# Patient Record
Sex: MALE | Employment: FULL TIME | URBAN - METROPOLITAN AREA
[De-identification: names, ages, dates, MRNs, and addresses within clinical notes are randomized per-mention and may not be internally consistent; named-entity substitution may affect disease eponyms.]

---

## 2019-10-21 ENCOUNTER — OFFICE VISIT (OUTPATIENT)
Dept: NEUROLOGY | Age: 24
End: 2019-10-21

## 2019-10-21 ENCOUNTER — DOCUMENTATION ONLY (OUTPATIENT)
Dept: NEUROLOGY | Age: 24
End: 2019-10-21

## 2019-10-21 VITALS
DIASTOLIC BLOOD PRESSURE: 70 MMHG | WEIGHT: 185 LBS | RESPIRATION RATE: 16 BRPM | HEART RATE: 70 BPM | SYSTOLIC BLOOD PRESSURE: 142 MMHG | OXYGEN SATURATION: 98 % | HEIGHT: 75 IN | BODY MASS INDEX: 23 KG/M2

## 2019-10-21 DIAGNOSIS — G40.B09 NONINTRACTABLE JUVENILE MYOCLONIC EPILEPSY WITHOUT STATUS EPILEPTICUS (HCC): Primary | ICD-10-CM

## 2019-10-21 RX ORDER — DIVALPROEX SODIUM 500 MG/1
1000 TABLET, DELAYED RELEASE ORAL 2 TIMES DAILY
COMMUNITY
End: 2019-10-21 | Stop reason: SDUPTHER

## 2019-10-21 RX ORDER — DIVALPROEX SODIUM 500 MG/1
1000 TABLET, DELAYED RELEASE ORAL 2 TIMES DAILY
Qty: 360 TAB | Refills: 3 | Status: SHIPPED | OUTPATIENT
Start: 2019-10-21

## 2019-10-21 NOTE — PATIENT INSTRUCTIONS
PRESCRIPTION REFILL POLICY [facility] Statement to Patients April 1, 2014 In an effort to ensure the large volume of patient prescription refills is processed in the most efficient and expeditious manner, we are asking our patients to assist us by calling your Pharmacy for all prescription refills, this will include also your  Mail Order Pharmacy. The pharmacy will contact our office electronically to continue the refill process. Please do not wait until the last minute to call your pharmacy. We need at least 48 hours (2days) to fill prescriptions. We also encourage you to call your pharmacy before going to  your prescription to make sure it is ready. With regard to controlled substance prescription refill requests (narcotic refills) that need to be picked up at our office, we ask your cooperation by providing us with at least 72 hours (3days) notice that you will need a refill. We will not refill narcotic prescription refill requests after 4:00pm on any weekday, Monday through Thursday, or after 2:00pm on Fridays, or on the weekends. We encourage everyone to explore another way of getting your prescription refill request processed using [employer], our patient web portal through our electronic medical record system. [employer] is an efficient and effective way to communicate your medication request directly to the office and  downloadable as an trevon on your smart phone . [employer] also features a review functionality that allows you to view your medication list as well as leave messages for your physician. Are you ready to get connected? If so please review the attatched instructions or speak to any of our staff to get you set up right away! Thank you so much for your cooperation. Should you have any questions please contact our Practice Administrator. The Physicians and Staff,  [facility]

## 2019-10-21 NOTE — PROGRESS NOTES
Mr. Elliott Rodriguez presents as a new patient for evaluation of possible epilepsy. Depression screening done on patient.

## 2019-10-21 NOTE — PROGRESS NOTES
Chief Complaint   Patient presents with    Neurologic Problem         HISTORY OF PRESENT ILLNESS  Hi Obrien is a 26 y.o. male who came in to [facility] care. He used to follow-up with a neurologist in [place]. He was diagnosed with juvenile myoclonic epilepsy at around age 7. He used to be on Keppra which she took until about 2017 when he had breakthrough seizures. He had 3 seizures within 2 weeks. Then he was switched to Depakote and was taking 750 mg twice daily. Was doing quite well until July of this year when he had another generalized tonic-clonic seizure. Then the dose was switched to 1000 mg twice daily and he has been doing fine on it so far. He does not remember his seizures and they are all witnessed generalized tonic-clonic events associated with tongue bite. Denies any myoclonic twitches anymore. There is no family history. He plans to live in the [facility] for the near future and his girlfriend is in [facility]. He is currently working in [employer]. History reviewed. No pertinent past medical history. Current Outpatient Medications   Medication Sig    divalproex DR (DEPAKOTE) 500 mg tablet Take 2 Tabs by mouth two (2) times a day. No current facility-administered medications for this visit. Not on File  History reviewed. No pertinent family history.   Social History     Tobacco Use    Smoking status: Never Smoker    Smokeless tobacco: Never Used   Substance Use Topics    Alcohol use: Not Currently    Drug use: Not on file     Past Surgical History:   Procedure Laterality Date    HX HEENT           REVIEW OF SYSTEMS  Review of Systems - History obtained from the patient  Psychological ROS: negative  ENT ROS: negative  Hematological and Lymphatic ROS: negative  Endocrine ROS: negative  Respiratory ROS: no cough, shortness of breath, or wheezing  Cardiovascular ROS: no chest pain or dyspnea on exertion  Gastrointestinal ROS: no abdominal pain, change in bowel habits, or black or bloody stools  Genito-Urinary ROS: no dysuria, trouble voiding, or hematuria  Musculoskeletal ROS: negative  Dermatological ROS: negative      PHYSICAL EXAMINATION:    Visit Vitals  /70   Pulse 70   Resp 16   Ht 6' 3\" (1.905 m)   Wt 83.9 kg (185 lb)   SpO2 98%   BMI 23.12 kg/m²     General:  Well defined, nourished, and groomed individual in no acute distress. Neck: Supple, nontender, thyroid within normal limits, no JVD, no bruits, no pain with resistance to active range of motion. Heart: Regular rate and rhythm, no murmurs, rub, or gallop. Normal S1S2. Lungs:  Equal chest expansion, no cough, no wheeze  Musculoskeletal:  Extremities revealed no edema and had full range of motion of joints. Psych:  Good mood and bright affect    NEUROLOGICAL EXAMINATION:     Mental Status:   Alert and oriented to person, place, and time. Attention span and concentration are normal. Speech is fluent. Cranial Nerves:    II, III, IV, VI:  Visual acuity grossly intact. Visual fields are normal.    Pupils are equal, round, and reactive to light and accommodation. Extra-ocular movements are full and fluid. V-XII: Hearing is grossly intact. Facial features are symmetric, with normal sensation and strength. The palate rises symmetrically and the tongue protrudes midline. Sternocleidomastoids 5/5. Motor Examination: Normal tone, bulk, and strength. 5/5 muscle strength throughout. No cogwheel rigidity or clonus present. Sensory exam:  Normal throughout to pinprick, temperature, and vibration sense. Normal proprioception. Coordination:  Finger to nose and rapid arm movement testing was normal.   No resting or intention tremor    Gait and Station:  Steady while walking on toes, heels, and with tandem walking. Normal arm swing. No Rhomberg or pronator drift. No muscle wasting or fasiculations noted. Reflexes:  DTRs 2+ throughout. Toes downgoing. LABS / IMAGING  Old records are not available for review    ASSESSMENT    ICD-10-CM ICD-9-CM    1. Nonintractable juvenile myoclonic epilepsy without status epilepticus ([facility] G40. B09 345.10 divalproex DR (DEPAKOTE) 500 mg tablet      EEG       DISCUSSION  Mr. Susan Carranza has established diagnosis of juvenile myoclonic epilepsy by history  I will request old records including recent labs  Obtain EEG  Refills were provided for Depakote  He is aware of the state law of no driving for 6 months after a seizure  Follow-up annually or earlier if needed    Sayda Ferrell MD  Diplomate, American Board of Psychiatry & Neurology (Neurology)  Gilberto Escamilla Board of Psychiatry & Neurology (Clinical Neurophysiology)  Diplomate, American Board of Electrodiagnostic Medicine  This note will not be viewable in [address]

## 2020-03-27 ENCOUNTER — HOSPITAL ENCOUNTER (EMERGENCY)
Age: 25
Discharge: HOME | End: 2020-03-27
Attending: STUDENT IN AN ORGANIZED HEALTH CARE EDUCATION/TRAINING PROGRAM
Payer: OTHER MISCELLANEOUS

## 2020-03-27 ENCOUNTER — APPOINTMENT (OUTPATIENT)
Dept: CT IMAGING | Age: 25
End: 2020-03-27
Attending: PHYSICIAN ASSISTANT
Payer: OTHER MISCELLANEOUS

## 2020-03-27 VITALS
RESPIRATION RATE: 16 BRPM | HEIGHT: 75 IN | BODY MASS INDEX: 23.85 KG/M2 | DIASTOLIC BLOOD PRESSURE: 74 MMHG | OXYGEN SATURATION: 98 % | SYSTOLIC BLOOD PRESSURE: 117 MMHG | HEART RATE: 77 BPM | WEIGHT: 191.8 LBS | TEMPERATURE: 97.9 F

## 2020-03-27 DIAGNOSIS — S09.90XA INJURY OF HEAD, INITIAL ENCOUNTER: ICD-10-CM

## 2020-03-27 DIAGNOSIS — S06.0X0A CONCUSSION WITHOUT LOSS OF CONSCIOUSNESS, INITIAL ENCOUNTER: Primary | ICD-10-CM

## 2020-03-27 PROCEDURE — 72125 CT NECK SPINE W/O DYE: CPT

## 2020-03-27 PROCEDURE — 99283 EMERGENCY DEPT VISIT LOW MDM: CPT

## 2020-03-27 PROCEDURE — 70450 CT HEAD/BRAIN W/O DYE: CPT

## 2020-03-27 NOTE — LETTER
[facility] 
[address] [address] 
[phone] Work/School Note Date: 3/27/2020 To Whom It May concern: Hi Obrien was seen and treated today in the emergency room by the following provider(s): 
Physician Assistant: Al Plaza. Hi Obrien may return to work on 3/28/2020.  
 
Sincerely, 
 
 
 
 
Maninder Barbour MD

## 2020-03-27 NOTE — ED PROVIDER NOTES
HPI   Pt is a 67-year-old male with a history of epilepsy and concussion. He presents to the emergency department today with head injury. He states that he was at work, loading boxes of wine onto a shelf and 1 of the boxes slid, hitting him on the top of the head. He Noticed immediate headache, ringing in his ears, felt fatigued and lightheaded and then approximately 20 minutes later vomited. He was sent to [facility] by his employer, and [facility] sent pt here based on symptoms and mechanism with patient medical hx. He is persistently nauseated at this time and continues to experience tinnitus. Patient states that his headache is improved, he denies dizziness or lightheadedness at this time. He is experiencing pain in the C2-C3 area of his neck that is exacerbated with rotation of the neck. Pt denies chest pain, shortness of breath, difficulty breathing, unusual bleeding, bruising, or swelling. Past Medical History:   Diagnosis Date    Aortic stenosis     Asthma     Concussion     Epilepsy [facility])        Past Surgical History:   Procedure Laterality Date    HX HEENT      HX TONSILLECTOMY           No family history on file.     Social History     Socioeconomic History    Marital status: SINGLE     Spouse name: Not on file    Number of children: Not on file    Years of education: Not on file    Highest education level: Not on file   Occupational History    Not on file   Social Needs    Financial resource strain: Not on file    Food insecurity     Worry: Not on file     Inability: Not on file    Transportation needs     Medical: Not on file     Non-medical: Not on file   Tobacco Use    Smoking status: Never Smoker    Smokeless tobacco: Never Used   Substance and Sexual Activity    Alcohol use: Yes     Comment: occassionally    Drug use: Never    Sexual activity: Not on file   Lifestyle    Physical activity     Days per week: Not on file     Minutes per session: Not on file    Stress: Not on file   Relationships    Social connections     Talks on phone: Not on file     Gets together: Not on file     Attends [employer] service: Not on file     Active member of club or organization: Not on file     Attends meetings of clubs or organizations: Not on file     Relationship status: Not on file    Intimate partner violence     Fear of current or ex partner: Not on file     Emotionally abused: Not on file     Physically abused: Not on file     Forced sexual activity: Not on file   Other Topics Concern    Not on file   Social History Narrative    Not on file         ALLERGIES: Patient has no known allergies. Review of Systems   Constitutional: Positive for fatigue. Negative for chills and fever. HENT: Positive for tinnitus. Negative for congestion, ear pain, hearing loss, sinus pain, sore throat and trouble swallowing. Eyes: Negative for photophobia and visual disturbance. Respiratory: Negative for chest tightness and shortness of breath. Cardiovascular: Negative for chest pain. Gastrointestinal: Positive for nausea and vomiting. Negative for abdominal pain. Genitourinary: Negative for dysuria. Musculoskeletal: Positive for neck pain. Negative for back pain, joint swelling and neck stiffness. Neurological: Positive for dizziness, light-headedness and headaches. Negative for tremors, seizures, syncope, speech difficulty, weakness and numbness. Hematological: Negative for adenopathy. Does not bruise/bleed easily. Vitals:    03/27/20 1131   BP: 112/82   Pulse: 82   Resp: 16   Temp: 97.8 °F (36.6 °C)   SpO2: 100%   Weight: 87 kg (191 lb 12.8 oz)   Height: 6' 3\" (1.905 m)            Physical Exam  Constitutional:       General: He is not in acute distress. Appearance: Normal appearance. He is normal weight. He is not ill-appearing, toxic-appearing or diaphoretic. HENT:      Head: Normocephalic and atraumatic.  No raccoon eyes, Thornton's sign, abrasion, contusion, masses, right periorbital erythema or left periorbital erythema. Hair is normal.      Jaw: There is normal jaw occlusion. Comments: Contusion with swelling to between eyes. Tenderness without contusion to top of crown     Right Ear: Tympanic membrane, ear canal and external ear normal. There is no impacted cerumen. Left Ear: Tympanic membrane, ear canal and external ear normal. There is no impacted cerumen. Nose: Nose normal.      Mouth/Throat:      Mouth: Mucous membranes are moist.      Pharynx: Oropharynx is clear. No oropharyngeal exudate or posterior oropharyngeal erythema. Eyes:      Extraocular Movements: Extraocular movements intact. Pupils: Pupils are equal, round, and reactive to light. Neck:      Musculoskeletal: Pain with movement, spinous process tenderness and muscular tenderness present. No edema, erythema, neck rigidity or crepitus. Trachea: Trachea and phonation normal.        Comments: Pt has pain with lateral  / rotational ROM secondary to axial loading injury  Cardiovascular:      Rate and Rhythm: Normal rate and regular rhythm. Pulses: Normal pulses. Heart sounds: Normal heart sounds. Pulmonary:      Effort: Pulmonary effort is normal.      Breath sounds: Normal breath sounds. Abdominal:      General: There is no distension. Palpations: Abdomen is soft. Tenderness: There is no abdominal tenderness. Musculoskeletal:         General: Swelling, tenderness and signs of injury present. Comments: Pt has mild swelling between eyebrows, tenderness to top of head on palpation. Lymphadenopathy:      Cervical: No cervical adenopathy. Skin:     General: Skin is warm and dry. Coloration: Skin is not pale. Findings: Bruising present. No erythema or rash. Comments: See msk   Neurological:      General: No focal deficit present. Mental Status: He is alert and oriented to person, place, and time. Mental status is at baseline. Cranial Nerves: No cranial nerve deficit. Sensory: No sensory deficit. Motor: No weakness. Coordination: Coordination normal.      Gait: Gait normal.   Psychiatric:         Mood and Affect: Mood normal.         Behavior: Behavior normal.         Thought Content: Thought content normal.         Judgment: Judgment normal.          MDM     Pt is a well appearing male with hx of epilepsy, presenting today with HPI and mechanism concerning for axial loading injury of c-spine and potential head injury. PT scans returned normal; he has been provided info on concussion and head injury, and has been advised to use motrin for muscle pain in his neck. Pt understands to follow up with PCP for further minor sxs, and come back to ED for worsening sxs.   Procedures

## 2020-03-27 NOTE — ED TRIAGE NOTES
Triage: pt at work lifting case of wine above head and it fell off shelf hitting him in the head causing him to have ringing in ears, nausea, vomiting (1 episode), dizziness and headache. Denies LOC.

## 2020-03-27 NOTE — ED NOTES
Pt ambulatory out of ED with discharge instructions in hand given by MARGARET Bowser; pt verbalized understanding of discharge paperwork and time allotted for questions. VSS. Pt alert and oriented.

## 2023-05-19 RX ORDER — DIVALPROEX SODIUM 500 MG/1
1000 TABLET, DELAYED RELEASE ORAL 2 TIMES DAILY
COMMUNITY
Start: 2019-10-21